# Patient Record
Sex: MALE | Race: WHITE | Employment: OTHER | URBAN - METROPOLITAN AREA
[De-identification: names, ages, dates, MRNs, and addresses within clinical notes are randomized per-mention and may not be internally consistent; named-entity substitution may affect disease eponyms.]

---

## 2017-09-15 ENCOUNTER — OFFICE VISIT (OUTPATIENT)
Dept: CARDIOLOGY CLINIC | Age: 82
End: 2017-09-15

## 2017-09-15 VITALS
SYSTOLIC BLOOD PRESSURE: 140 MMHG | BODY MASS INDEX: 25.43 KG/M2 | HEART RATE: 58 BPM | DIASTOLIC BLOOD PRESSURE: 78 MMHG | WEIGHT: 162 LBS | OXYGEN SATURATION: 90 % | HEIGHT: 67 IN

## 2017-09-15 DIAGNOSIS — I77.9 BILATERAL CAROTID ARTERY DISEASE (HCC): ICD-10-CM

## 2017-09-15 DIAGNOSIS — I48.0 PAROXYSMAL ATRIAL FIBRILLATION (HCC): ICD-10-CM

## 2017-09-15 DIAGNOSIS — R06.02 SOB (SHORTNESS OF BREATH): Primary | ICD-10-CM

## 2017-09-15 DIAGNOSIS — J44.9 CHRONIC OBSTRUCTIVE PULMONARY DISEASE, UNSPECIFIED COPD TYPE (HCC): ICD-10-CM

## 2017-09-15 DIAGNOSIS — N18.30 CHRONIC KIDNEY DISEASE, STAGE 3 (MODERATE): ICD-10-CM

## 2017-09-15 DIAGNOSIS — I77.1 SUBCLAVIAN ARTERY STENOSIS, RIGHT (HCC): ICD-10-CM

## 2017-09-15 DIAGNOSIS — I25.10 CORONARY ARTERY DISEASE INVOLVING NATIVE CORONARY ARTERY OF NATIVE HEART WITHOUT ANGINA PECTORIS: ICD-10-CM

## 2017-09-15 RX ORDER — AMLODIPINE BESYLATE 10 MG/1
10 TABLET ORAL
Refills: 0 | COMMUNITY
Start: 2017-08-18

## 2017-09-15 RX ORDER — METOPROLOL SUCCINATE 50 MG/1
50 TABLET, EXTENDED RELEASE ORAL DAILY
COMMUNITY

## 2017-09-15 RX ORDER — BUMETANIDE 1 MG/1
1 TABLET ORAL DAILY
Qty: 30 TAB | Refills: 6 | Status: SHIPPED | OUTPATIENT
Start: 2017-09-15

## 2017-09-15 RX ORDER — TAMSULOSIN HYDROCHLORIDE 0.4 MG/1
CAPSULE ORAL
Refills: 2 | COMMUNITY
Start: 2017-06-19 | End: 2017-10-06

## 2017-09-15 RX ORDER — OMEPRAZOLE 20 MG/1
20 CAPSULE, DELAYED RELEASE ORAL 2 TIMES DAILY
COMMUNITY

## 2017-09-15 RX ORDER — HYDRALAZINE HYDROCHLORIDE 25 MG/1
25 TABLET, FILM COATED ORAL DAILY
Refills: 0 | COMMUNITY
Start: 2017-08-18

## 2017-09-15 NOTE — PROGRESS NOTES
PATIENT NAME: Azul Roman         80 y.o.      11/20/1932              DATE:9/15/2017    REASON FOR VISIT: Shortness of breath    HISTORY OF PRESENT ILLNESS: The patient is chronically short of breath. This has been presumed secondary to oxygen dependent chronic obstructive pulmonary disease. He has, however, noticed a marked deterioration in his breathing over the past few weeks. Chest x-ray has been done which is read as being consistent with \"fluid\" versus pneumonia. He has received antibiotics for a possible pulmonary infection. There is been no improvement in the breathing. The patient had a Cardiolite scan done approximately 2-1/2 years ago that showed a reversible defect on the inferior wall. Left ventricular wall motion was normal at rest.    An echocardiogram done approximately 1-1/2 years ago showed normal left ventricular size and wall motion and normal ejection fraction. There was mild aortic stenosis. The patient was given a prescription for Lasix by his primary care physician. The local pharmacies are apparently out of this medication at present and have back ordered it. The patient has a history of peripheral vascular disease, carotid artery disease, and a subclavian stenosis. He is hypertensive and has a history of hyperlipidemia. Denies diabetes.     PAST MEDICAL HISTORY:   Past Medical History:  No date: AAA (abdominal aortic aneurysm) without ruptur*      Comment: infrarenal 3.4 cm (CT 2011 @ [facility])  No date: Anemia  No date: Aneurysm ([facility] [address] [address]      Comment: abdominal  No date: Balance problems  No date: CAD (coronary artery disease), native coronary*      Comment: revrsible perfusion defect inferiorly                (Lexiscan perfusion test April 2015)  No date: Carotid artery disease ([facility] [address]      Comment: mod LICA disease (Oct 8840 - outside records)  No date: Chronic kidney disease      Comment: Stage 3  No date: Chronic obstructive pulmonary disease ([address] Comment: extensive emphysema (CT 2011 @ [facility])  No date: Esophagitis      Comment: gr 4 (EGD April 2014)  No date: GI bleed      Comment: while on Xarelto; EGD and colonoscopy done                (esophagitis, polyps)  No date: Hearing loss  No date: High cholesterol  No date: Hypertension  No date: Lumbar radiculopathy  No date: Neuropathy ([facility])      Comment: severe lumbar disease by MRI  No date: Osteoarthritis of right foot  No date: Osteoarthritis of right hip  No date: Paroxysmal atrial fibrillation (HCC)  No date: Right buttock pain  No date: Right foot pain  No date: Right groin pain  No date: Right hip pain  No date: Sleep disorder  No date: Subclavian artery stenosis, right (HCC)      Comment: decreased pulse & BP, bruit  No date: Thromboembolus ([address]  No date: Trochanteric bursitis of right hip  No date: Wears glasses  No date: Weight loss    PAST SURGICAL HISTORY:   Past Surgical History:  No date: ABDOMEN SURGERY PROC UNLISTED      Comment: AAA, apendectomy, hernia  No date: CHEST SURGERY PROCEDURE UNLISTED      Comment: AAA  No date: HX APPENDECTOMY  No date: HX BACK SURGERY  4/20/14: HX COLONOSCOPY  4/18/14: HX ENDOSCOPY  No date: VASCULAR SURGERY PROCEDURE UNLIST      Comment: AAA      SOCIAL HISTORY:  Social History    Marital status:              Spouse name:                       Years of education:                 Number of children:               Social History Main Topics    Smoking status: Never Smoker                                                                Smokeless status: Never Used                        Alcohol use:  Yes               ALLERGIES:    -- Gabapentin -- Not Reported This Time   -- Lyrica [Pregabalin] -- Other (comments)    --  \"Hallucinations\"   -- Meclizine -- Not Reported This Time    --  Urinary retention   -- Tussionex -- Not Reported This Time    --  Decreased voiding     CURRENT MEDICATIONS:   Current Outpatient Prescriptions:  amLODIPine (NORVASC) 10 mg tablet, Take 10 mg by mouth daily. hydrALAZINE (APRESOLINE) 25 mg tablet, Take 1 tablet (25 mg) in the morning and take 2 tablets (50 mg) in the evening  tamsulosin (FLOMAX) 0.4 mg capsule,   omeprazole (PRILOSEC) 20 mg capsule, Take 20 mg by mouth two (2) times a day. FERROUS SULFATE (IRON PO), Take  by mouth. LEVOFLOXACIN (LEVAQUIN PO), Take  by mouth every other day. metoprolol succinate (TOPROL-XL) 50 mg XL tablet, Take 50 mg by mouth daily. escitalopram oxalate (LEXAPRO) 10 mg tablet, Take 10 mg by mouth daily. CYANOCOBALAMIN, VITAMIN B-12, (VITAMIN B-12 PO), Take  by mouth once as needed (once a month). aspirin delayed-release 81 mg tablet, Take 1 Tab by mouth daily. tiotropium (SPIRIVA WITH HANDIHALER) 18 mcg inhalation capsule, Take 1 Cap by inhalation daily. albuterol (PROAIR HFA) 90 mcg/actuation inhaler, Take 1 Puff by inhalation every six (6) hours as needed for Wheezing. No current facility-administered medications for this visit. REVIEW of SYSTEMS:History obtained from chart review and the patient  General ROS: 15 pounds weight gain in the last year. Hematological and Lymphatic ROS: negative for - bleeding problems  Respiratory ROS: See history of present illness  Cardiovascular ROS: See history of present illness. Denies chest pain. He has a history of atrial fibrillation. Denies palpitation. Denies orthopnea and paroxysmal nocturnal dyspnea. Denies syncope and presyncope.   Musculoskeletal ROS: The patient will not be able to ambulate on a treadmill because of discomfort in his knees and hips     PHYSICAL EXAMINATION:   /78  Pulse (!) 58  Ht 5' 7\" (1.702 m)  Wt 73.5 kg (162 lb)  SpO2 90%  BMI 25.37 kg/m2  BP Readings from Last 3 Encounters:  09/15/17 : 140/78  07/09/16 : 129/67  05/14/15 : 140/78    Pulse Readings from Last 3 Encounters:  09/15/17 : (!) 58  07/09/16 : 66  05/14/15 : 69    Wt Readings from Last 3 Encounters:  09/15/17 : 73.5 kg (162 lb)  07/09/16 : 65.8 kg (145 lb)  05/14/15 : 75.3 kg (166 lb)    General: Well-developed white male in no apparent distress. Neck: No jugular venous distention. Carotid upstrokes 2+ with bilateral bruits. Chest: Clear to auscultation. HEENT: Sclera clear. Mucous membranes pink and moist.  Heart: PMI not palpable. Regular rhythm. 2/6 systolic ejection murmur along left sternal border and at the base radiating toward the neck. No gallop. Abdomen: Unable to palpate abdominal aorta. Extremities: No edema. Skin: Warm and dry. No stasis changes. Neurologic: Alert, oriented. No facial asymmetry. Speech within normal limits. Motor deferred. EKG: Sinus bradycardia. Heart rate 58. Nonspecific ST-T changes in the high lateral leads. Left anterior fascicular block    IMPRESSION:   Exertional shortness of breath unknown etiology. The recent deterioration might represent an anginal equivalent  History of abnormal Cardiolite scan with reversible defect on the inferior wall  History of aortic stenosis, mild on last echo  History of paroxysmal atrial fibrillation, presently in sinus rhythm and asymptomatic  Hypertension, controlled  Peripheral vascular disease  Carotid artery disease  Subclavian stenosis  History of renal insufficiency  History of gastrointestinal bleeding on Xarelto    PLAN:  Repeat echocardiography to evaluate the aortic stenosis  Patient will be unable to ambulate on a treadmill. Pharmacologic stress testing with Cardiolite  Bumex 1 mg p.o. daily  Return to office after these tests have been performed    The diagnoses and plan were discussed with patient and caregiver. All questions answered. Plan of care agreed to by all concerned. Nhung Ricketts.  MD Courtney       ,

## 2017-09-15 NOTE — PROGRESS NOTES
1. Have you been to the ER, urgent care clinic since your last visit? Hospitalized since your last visit? no  2. Have you seen or consulted any other health care providers outside of the [address] [place] [address] since your last visit? Include any pap smears or colon screening.   no

## 2017-09-15 NOTE — MR AVS SNAPSHOT
Visit Information Date & Time Provider Department Dept. Phone Encounter #  
 9/15/2017 11:20 AM Dany Bower MD [facility] [address] [id number] Your Appointments 10/6/2017 11:00 AM  
Follow Up with Dany Bower MD  
[facility] ([facility]) Appt Note: follow up after testing [facility] [address] [address] [address] [phone] [facility] [address] Upcoming Health Maintenance Date Due DTaP/Tdap/Td series (1 - Tdap) 11/20/1953 ZOSTER VACCINE AGE 60> 9/20/1992 GLAUCOMA SCREENING Q2Y 11/20/1997 Pneumococcal 65+ Low/Medium Risk (1 of 2 - PCV13) 11/20/1997 MEDICARE YEARLY EXAM 11/20/1997 INFLUENZA AGE 9 TO ADULT 8/1/2017 Allergies as of 9/15/2017  Review Complete On: 9/15/2017 By: Angel Ya Severity Noted Reaction Type Reactions Gabapentin  05/14/2015    Not Reported This Time Lyrica [Pregabalin]  07/14/2014    Other (comments) \"Hallucinations\" Meclizine  05/14/2015    Not Reported This Time Urinary retention Tussionex  05/14/2015    Not Reported This Time Decreased voiding Current Immunizations  Never Reviewed No immunizations on file. Not reviewed this visit You Were Diagnosed With   
  
 Codes Comments SOB (shortness of breath)    -  Primary ICD-10-CM: R06.02 
ICD-9-CM: 786.05 Coronary artery disease involving native coronary artery of native heart without angina pectoris     ICD-10-CM: I25.10 ICD-9-CM: 414.01 Vitals BP Pulse Height(growth percentile) Weight(growth percentile) SpO2 BMI  
 140/78 (!) 58 5' 7\" (1.702 m) 162 lb (73.5 kg) 90% 25.37 kg/m2 Smoking Status Never Smoker Vitals History BMI and BSA Data Body Mass Index Body Surface Area  
 25.37 kg/m 2 1.86 m 2 Preferred Pharmacy Pharmacy Name Phone [id number] [place], [address] [address] [phone] Your Updated Medication List  
  
   
This list is accurate as of: 9/15/17 12:15 PM.  Always use your most recent med list. amLODIPine 10 mg tablet Commonly known as:  Remlaura Grates Take 10 mg by mouth daily. aspirin delayed-release 81 mg tablet Take 1 Tab by mouth daily. bumetanide 1 mg tablet Commonly known as:  Rumalda Fritter Take 1 Tab by mouth daily. escitalopram oxalate 10 mg tablet Commonly known as:  Marylu Angst Take 10 mg by mouth daily. hydrALAZINE 25 mg tablet Commonly known as:  APRESOLINE Take 1 tablet (25 mg) in the morning and take 2 tablets (50 mg) in the evening IRON PO Take  by mouth. LEVAQUIN PO Take  by mouth every other day. metoprolol succinate 50 mg XL tablet Commonly known as:  TOPROL-XL Take 50 mg by mouth daily. PriLOSEC 20 mg capsule Generic drug:  omeprazole Take 20 mg by mouth two (2) times a day. PROAIR HFA 90 mcg/actuation inhaler Generic drug:  albuterol Take 1 Puff by inhalation every six (6) hours as needed for Wheezing. SPIRIVA WITH HANDIHALER 18 mcg inhalation capsule Generic drug:  tiotropium Take 1 Cap by inhalation daily. tamsulosin 0.4 mg capsule Commonly known as:  FLOMAX  
  
 VITAMIN B-12 PO Take  by mouth once as needed (once a month). Prescriptions Sent to Pharmacy Refills  
 bumetanide (BUMEX) 1 mg tablet 6 Sig: Take 1 Tab by mouth daily. Class: Normal  
 Pharmacy: [address] [place] [address],[address], [facility] Ph #: [phone] Route: Oral  
  
We Performed the Following AMB POC EKG ROUTINE W/ 12 LEADS, INTER & REP [05274 CPT(R)] To-Do List   
 09/15/2017 ECHO:  2D ECHO COMPLETE ADULT (TTE) W OR WO CONTR   
  
 09/15/2017 ECG:  CARDIAC SPECT REST AND STRESS   
  
 09/15/2017 ECG:  NUCLEAR STRESS TEST   
  
 09/27/2017 8:15 AM  
  Appointment with HBV NUC CARD ROOM at [facility] NON-INVASIVE CARD ([phone]) This is a 2-part test which takes approximately 4 hours to complete. Please see part 2 of exam below for full instructions 09/27/2017 8:45 AM  
  Appointment with HBV NUC CARD ROOM at [facility] NON-INVASIVE CARD ([phone]) 1-No eating or coffee after midnight  2-Do not take diabetic meds (bring with) 3-Please take all other meds unless specified by cardiology 09/27/2017 9:00 AM  
  Appointment with HBV- IE33 MACHINE (WT ) at [facility] NON-INVASIVE CARD ([phone]) Age Limit for ALL Heart procedures @ all [facility] facilities: 18 yrs and older only. Under the age of 25, refer to [facility] ([phone]). Wt Limit: 350lbs. This study requires patient to bring a written physician's order or MD office may fax the order to Central Scheduling at [phone]. Patient needs to bring a current list of all medications. No preparation is required for this study. Patients should report 15 minutes prior to their appointment time to the [facility], [address] [place] [address]/[address]. Introducing hospitals & HEALTH SERVICES! Dear Kami Celaya: Thank you for requesting a [employer] account. Our records indicate that you already have an active [employer] account. You can access your account anytime at [email or web address] Did you know that you can access your hospital and ER discharge instructions at any time in [employer]? You can also review all of your test results from your hospital stay or ER visit. Additional Information If you have questions, please visit the Frequently Asked Questions section of the [employer] website at [email or web address] Remember, [employer] is NOT to be used for urgent needs. For medical emergencies, dial 911. Now available from your iPhone and Android! Please provide this summary of care documentation to your next provider. Your primary care clinician is listed as Ángel Eduardo. If you have any questions after today's visit, please call [phone].

## 2017-09-27 ENCOUNTER — HOSPITAL ENCOUNTER (OUTPATIENT)
Dept: NON INVASIVE DIAGNOSTICS | Age: 82
Discharge: HOME | End: 2017-09-27
Payer: MEDICARE

## 2017-09-27 DIAGNOSIS — R06.02 SOB (SHORTNESS OF BREATH): ICD-10-CM

## 2017-09-27 DIAGNOSIS — I25.10 CORONARY ARTERY DISEASE INVOLVING NATIVE CORONARY ARTERY OF NATIVE HEART WITHOUT ANGINA PECTORIS: ICD-10-CM

## 2017-09-27 LAB
ATTENDING PHYSICIAN, CST07: NORMAL
DIAGNOSIS, 93000: NORMAL
DUKE TM SCORE RESULT, CST14: NORMAL
DUKE TREADMILL SCORE, CST13: NORMAL
ECG INTERP BEFORE EX, CST11: NORMAL
ECG INTERP DURING EX, CST12: NORMAL
FUNCTIONAL CAPACITY, CST17: NORMAL
KNOWN CARDIAC CONDITION, CST08: NORMAL
MAX. DIASTOLIC BP, CST04: 60 MMHG
MAX. HEART RATE, CST05: 85 BPM
MAX. SYSTOLIC BP, CST03: 142 MMHG
OVERALL BP RESPONSE TO EXERCISE, CST16: NORMAL
OVERALL HR RESPONSE TO EXERCISE, CST15: NORMAL
PEAK EX METS, CST10: 1 METS
PROTOCOL NAME, CST01: NORMAL
TEST INDICATION, CST09: NORMAL

## 2017-09-27 PROCEDURE — 93306 TTE W/DOPPLER COMPLETE: CPT

## 2017-09-27 PROCEDURE — 74011250636 HC RX REV CODE- 250/636

## 2017-09-27 PROCEDURE — 78452 HT MUSCLE IMAGE SPECT MULT: CPT

## 2017-09-27 PROCEDURE — A9500 TC99M SESTAMIBI: HCPCS

## 2017-09-27 PROCEDURE — 93017 CV STRESS TEST TRACING ONLY: CPT

## 2017-09-27 RX ORDER — SODIUM CHLORIDE 0.9 % (FLUSH) 0.9 %
10 SYRINGE (ML) INJECTION AS NEEDED
Status: COMPLETED | OUTPATIENT
Start: 2017-09-27 | End: 2017-09-27

## 2017-09-27 RX ADMIN — Medication 10 ML: at 08:20

## 2017-09-27 RX ADMIN — Medication 10 ML: at 09:20

## 2017-09-27 RX ADMIN — REGADENOSON 0.4 MG: 0.08 INJECTION, SOLUTION INTRAVENOUS at 09:20

## 2017-09-27 NOTE — PROGRESS NOTES
Patient was injected with 43.75 millicuries 29KGJ Sestamibi on 9/27/17 at 0820. Patient was injected with 86.0 millicuries 68QAP Sestamibi on 9/27/17 at 0920. Patient's armbands were removed and placed in shred-it box.     Patient had a Nuclear Lexiscan Stress Test.

## 2017-09-28 NOTE — PROGRESS NOTES
Done per your note dated 9/15 \"IMPRESSION:   Exertional shortness of breath unknown etiology. The recent deterioration might represent an anginal equivalent  History of abnormal Cardiolite scan with reversible defect on the inferior wall  History of aortic stenosis, mild on last echo  History of paroxysmal atrial fibrillation, presently in sinus rhythm and asymptomatic  Hypertension, controlled  Peripheral vascular disease  Carotid artery disease  Subclavian stenosis  History of renal insufficiency  History of gastrointestinal bleeding on Xarelto     PLAN:  Repeat echocardiography to evaluate the aortic stenosis  Patient will be unable to ambulate on a treadmill.   Pharmacologic stress testing with Cardiolite  Bumex 1 mg p.o. daily  Return to office after these tests have been performed\"

## 2017-09-28 NOTE — PROCEDURES
[address] STRESS    Name:  Maura Greenwood  MR#:  [id number]  :  1932  Account #:  [de-identified]  Date of Adm:  2017  Date of Service:      PATIENT OF/ORDERING PHYSICIAN: Julio Oconnell MD    PRIMARY CARE PHYSICIAN: David Pro MD    REASON FOR STUDY: Shortness of breath, rule out anginal  equivalent. ELECTROCARDIOGRAM RESTING: Demonstrated normal sinus  rhythm with rate 67 and left axis deviation with left ventricular  hypertrophy, with some repolarization abnormality, without acute  change. PROCEDURE: The patient received 10.69 mCi of technetium-99  sestamibi intravenously and had a resting myocardial perfusion study  completed. He subsequently received Lexiscan 0.4 mg intravenously  followed by a 5 mL saline flush and was asked to move his legs while  seated for 3 minutes. He complained of some lightheadedness and  shortness of breath with Lexiscan administration, but no chest pain. He  had no significant ST-T changes. He subsequently received 33 mCi of  technetium-99m sestamibi intravenously and had a stress myocardial  perfusion study completed and compared to the resting study. FINDINGS: There appeared to be a small to medium-sized fixed  perfusion defect in the basal inferior wall, most likely related to  diaphragmatic soft tissue attenuation, with the remainder of the  myocardium adequately perfused without signs of ischemia or  infarction. Gated SPECT imaging demonstrated normal left ventricular  volumes, wall motion, and function with an ejection fraction of 70%. SUMMARY  1. Normal Lexiscan Cardiolite myocardial perfusion study. 2. Small fixed perfusion defect in the basal inferior wall, most  consistent with diaphragmatic soft tissue attenuation, although some  mild infarction without ischemia cannot be entirely excluded, with the  remainder of the myocardium adequately perfused without signs of  ischemia or infarction.   3. Normal left ventricular volumes, wall motion, and function with  ejection fraction of 70% on gated SPECT imaging. 4. Compared to the study of 04/01/2015, the mild, small to medium-  sized reversible perfusion defect in the inferior wall appeared to be a  small fixed perfusion defect in the basal inferior wall, more likely from  diaphragmatic soft tissue attenuation, although some infarction without  ischemia in distribution cannot be entirely excluded. There was a  gating abnormality and evaluation of LV function could not be obtained  on that study, but the ejection fraction was completely normal at 70%  on the current study. 5. [facility].         DO TATUM Roblreo / Rene.Grant  D:  09/27/2017   17:30  T:  09/27/2017   20:45  Job #:  [id number]

## 2017-10-02 NOTE — PROGRESS NOTES
Done per your note dated 9/15 \"Exertional shortness of breath unknown etiology. The recent deterioration might represent an anginal equivalent  History of abnormal Cardiolite scan with reversible defect on the inferior wall  History of aortic stenosis, mild on last echo  History of paroxysmal atrial fibrillation, presently in sinus rhythm and asymptomatic  Hypertension, controlled  Peripheral vascular disease  Carotid artery disease  Subclavian stenosis  History of renal insufficiency  History of gastrointestinal bleeding on Xarelto     PLAN:  Repeat echocardiography to evaluate the aortic stenosis  Patient will be unable to ambulate on a treadmill.   Pharmacologic stress testing with Cardiolite  Bumex 1 mg p.o. daily  Return to office after these tests have been performed\"

## 2017-10-06 ENCOUNTER — OFFICE VISIT (OUTPATIENT)
Dept: CARDIOLOGY CLINIC | Age: 82
End: 2017-10-06

## 2017-10-06 VITALS
DIASTOLIC BLOOD PRESSURE: 80 MMHG | BODY MASS INDEX: 24.64 KG/M2 | SYSTOLIC BLOOD PRESSURE: 160 MMHG | HEIGHT: 67 IN | OXYGEN SATURATION: 95 % | WEIGHT: 157 LBS | HEART RATE: 57 BPM

## 2017-10-06 DIAGNOSIS — I35.0 AORTIC VALVE STENOSIS, ETIOLOGY OF CARDIAC VALVE DISEASE UNSPECIFIED: Primary | ICD-10-CM

## 2017-10-06 DIAGNOSIS — I10 ESSENTIAL HYPERTENSION: ICD-10-CM

## 2017-10-06 NOTE — PROGRESS NOTES
1. Have you been to the ER, urgent care clinic since your last visit? Hospitalized since your last visit? no  2. Have you seen or consulted any other health care providers outside of the [address] [place] [address] since your last visit? Include any pap smears or colon screening.  no

## 2017-10-06 NOTE — MR AVS SNAPSHOT
Visit Information Date & Time Provider Department Dept. Phone Encounter #  
 10/6/2017 11:00 AM Merlinda Forget, MD [facility] [address] [phone] Upcoming Health Maintenance Date Due DTaP/Tdap/Td series (1 - Tdap) 11/20/1953 ZOSTER VACCINE AGE 60> 9/20/1992 GLAUCOMA SCREENING Q2Y 11/20/1997 Pneumococcal 65+ Low/Medium Risk (1 of 2 - PCV13) 11/20/1997 MEDICARE YEARLY EXAM 11/20/1997 INFLUENZA AGE 9 TO ADULT 8/1/2017 Allergies as of 10/6/2017  Review Complete On: 10/6/2017 By: Abdulaziz Blake Severity Noted Reaction Type Reactions Gabapentin  05/14/2015    Not Reported This Time Lyrica [Pregabalin]  07/14/2014    Other (comments) \"Hallucinations\" Meclizine  05/14/2015    Not Reported This Time Urinary retention Tussionex  05/14/2015    Not Reported This Time Decreased voiding Current Immunizations  Never Reviewed No immunizations on file. Not reviewed this visit You Were Diagnosed With   
  
 Codes Comments Aortic valve stenosis, etiology of cardiac valve disease unspecified    -  Primary ICD-10-CM: I35.0 ICD-9-CM: 424.1 Vitals BP Pulse Height(growth percentile) Weight(growth percentile) SpO2 BMI  
 160/80 (BP 1 Location: Left arm, BP Patient Position: Sitting) (!) 57 5' 7\" (1.702 m) 157 lb (71.2 kg) 95% 24.59 kg/m2 Smoking Status Never Smoker BMI and BSA Data Body Mass Index Body Surface Area 24.59 kg/m 2 1.83 m 2 Preferred Pharmacy Pharmacy Name Phone [id number] [address] [phone] Your Updated Medication List  
  
   
This list is accurate as of: 10/6/17 11:48 AM.  Always use your most recent med list. amLODIPine 10 mg tablet Commonly known as:  Jennifer Pace Take 10 mg by mouth daily as needed. aspirin delayed-release 81 mg tablet Take 1 Tab by mouth daily. bumetanide 1 mg tablet Commonly known as:  Severiano Bue Take 1 Tab by mouth daily. escitalopram oxalate 10 mg tablet Commonly known as:  Zaina Daria Take 10 mg by mouth daily. hydrALAZINE 25 mg tablet Commonly known as:  APRESOLINE Take 25 mg by mouth daily. IRON PO Take  by mouth.  
  
 metoprolol succinate 50 mg XL tablet Commonly known as:  TOPROL-XL Take 50 mg by mouth daily. PriLOSEC 20 mg capsule Generic drug:  omeprazole Take 20 mg by mouth two (2) times a day. PROAIR HFA 90 mcg/actuation inhaler Generic drug:  albuterol Take 1 Puff by inhalation every six (6) hours as needed for Wheezing. SPIRIVA WITH HANDIHALER 18 mcg inhalation capsule Generic drug:  tiotropium Take 1 Cap by inhalation daily. VITAMIN B-12 PO Take  by mouth once as needed (once a month). Patient Instructions Continue current medications. No changes. If you have any further questions or concerns, please contact our office. [phone] [facility] SERVICES! Dear Alexandra Ramos: Thank you for requesting a [employer] account. Our records indicate that you already have an active [employer] account. You can access your account anytime at [email or web address] Did you know that you can access your hospital and ER discharge instructions at any time in [employer]? You can also review all of your test results from your hospital stay or ER visit. Additional Information If you have questions, please visit the Frequently Asked Questions section of the [employer] website at [email or web address] Remember, Pre[employer] is NOT to be used for urgent needs. For medical emergencies, dial 911. Now available from your iPhone and Android! Please provide this summary of care documentation to your next provider. Your primary care clinician is listed as Nori Maher.  If you have any questions after today's visit, please call [phone].

## 2017-10-06 NOTE — PATIENT INSTRUCTIONS
Continue current medications. No changes.    If you have any further questions or concerns, please contact our office. [phone]

## 2017-10-06 NOTE — PROGRESS NOTES
PATIENT NAME: Robert Govea         80 y.o.      11/20/1932              DATE:10/6/2017    REASON FOR VISIT: Shortness of breath    HISTORY OF PRESENT ILLNESS: He took the Bumex for a week and states that it really did not help anything. His breathing improved shortly after that. He is comfortable at present. His echocardiogram showed mild aortic stenosis. There was normal left ventricular size and wall motion. Normal ejection fraction. Cardiolite scan was negative for ischemia. He has questions about the timing of his blood pressure medications. He is taking amlodipine 10 mg at night but his wife holds this if his blood pressure is 998 systolic. The blood pressure usually is elevated in the morning in the range of 160/100. He was also taking hydralazine 25 mg in the morning and 50 mg in the evening but the evening dose is been stopped and he is only taking the hydralazine once a day.     Denies chest pain and difficulty breathing    PAST MEDICAL HISTORY:   Past Medical History:  No date: AAA (abdominal aortic aneurysm) without ruptur*      Comment: infrarenal 3.4 cm (CT 2011 @ [facility])  No date: Anemia  No date: Aneurysm ([facility] [address]      Comment: abdominal  No date: Balance problems  No date: CAD (coronary artery disease), native coronary*      Comment: revrsible perfusion defect inferiorly                (Lexiscan perfusion test April 2015)  No date: Carotid artery disease ([facility]      Comment: mod LICA disease (Oct 9885 - outside records)  No date: Chronic kidney disease      Comment: Stage 3  No date: Chronic obstructive pulmonary disease ([facility]      Comment: extensive emphysema (CT 2011 @ [facility])  No date: Esophagitis      Comment: gr 4 (EGD April 2014)  No date: GI bleed      Comment: while on Xarelto; EGD and colonoscopy done                (esophagitis, polyps)  No date: Hearing loss  No date: High cholesterol  No date: Hypertension  No date: Lumbar radiculopathy  No date: Neuropathy Comment: severe lumbar disease by MRI  No date: Osteoarthritis of right foot  No date: Osteoarthritis of right hip  No date: Paroxysmal atrial fibrillation (HCC)  No date: Right buttock pain  No date: Right foot pain  No date: Right groin pain  No date: Right hip pain  No date: Sleep disorder  No date: Subclavian artery stenosis, right (HCC)      Comment: decreased pulse & BP, bruit  No date: Thromboembolus ([address]  No date: Trochanteric bursitis of right hip  No date: Wears glasses  No date: Weight loss    PAST SURGICAL HISTORY:   Past Surgical History:  No date: ABDOMEN SURGERY PROC UNLISTED      Comment: AAA, apendectomy, hernia  No date: CHEST SURGERY PROCEDURE UNLISTED      Comment: AAA  No date: HX APPENDECTOMY  No date: HX BACK SURGERY  4/20/14: HX COLONOSCOPY  4/18/14: HX ENDOSCOPY  No date: VASCULAR SURGERY PROCEDURE UNLIST      Comment: AAA      SOCIAL HISTORY:  Social History    Marital status:              Spouse name:                       Years of education:                 Number of children:               Social History Main Topics    Smoking status: Never Smoker                                                                Smokeless status: Never Used                        Alcohol use: Yes               ALLERGIES:    -- Gabapentin -- Not Reported This Time   -- Lyrica [Pregabalin] -- Other (comments)    --  \"Hallucinations\"   -- Meclizine -- Not Reported This Time    --  Urinary retention   -- Tussionex -- Not Reported This Time    --  Decreased voiding     CURRENT MEDICATIONS:   Current Outpatient Prescriptions:  amLODIPine (NORVASC) 10 mg tablet, Take 10 mg by mouth daily as needed. hydrALAZINE (APRESOLINE) 25 mg tablet, Take 25 mg by mouth daily. omeprazole (PRILOSEC) 20 mg capsule, Take 20 mg by mouth two (2) times a day. FERROUS SULFATE (IRON PO), Take  by mouth.  metoprolol succinate (TOPROL-XL) 50 mg XL tablet, Take 50 mg by mouth daily.    escitalopram oxalate (LEXAPRO) 10 mg tablet, Take 10 mg by mouth daily. CYANOCOBALAMIN, VITAMIN B-12, (VITAMIN B-12 PO), Take  by mouth once as needed (once a month). aspirin delayed-release 81 mg tablet, Take 1 Tab by mouth daily. tiotropium (SPIRIVA WITH HANDIHALER) 18 mcg inhalation capsule, Take 1 Cap by inhalation daily. albuterol (PROAIR HFA) 90 mcg/actuation inhaler, Take 1 Puff by inhalation every six (6) hours as needed for Wheezing. bumetanide (BUMEX) 1 mg tablet, Take 1 Tab by mouth daily. (Patient not taking: Reported on 10/6/2017)    No current facility-administered medications for this visit. REVIEW of SYSTEMS:Cardiovascular ROS: See history of present illness     PHYSICAL EXAMINATION:   /80 (BP 1 Location: Left arm, BP Patient Position: Sitting)  Pulse (!) 57  Ht 5' 7\" (1.702 m)  Wt 71.2 kg (157 lb)  SpO2 95%  BMI 24.59 kg/m2  BP Readings from Last 3 Encounters:  10/06/17 : 160/80  09/15/17 : 140/78  07/09/16 : 129/67    Pulse Readings from Last 3 Encounters:  10/06/17 : (!) 57  09/15/17 : (!) 58  07/09/16 : 66    Wt Readings from Last 3 Encounters:  10/06/17 : 71.2 kg (157 lb)  09/15/17 : 73.5 kg (162 lb)  07/09/16 : 65.8 kg (145 lb)    No jugular venous distention. Chest clear to auscultation. Heart: Regular rhythm. 2/6 systolic murmur at the base radiating to the neck extremities: No edema        IMPRESSION:   Shortness of breath noncardiac, improved  Aortic stenosis, mild  No evidence for myocardial ischemia  Hypertension    PLAN:  Take the amlodipine 10 mg in the morning rather than the evening. Take hydralazine 25 mg twice daily and do not hold the evening dose unless the systolic pressure is below 100  Echocardiography in 1 year        The diagnoses and plan were discussed with patient and spouse. All questions answered. Plan of care agreed to by all concerned. Garnette Closs.  MD Courtney       ,